# Patient Record
Sex: FEMALE | Race: WHITE | NOT HISPANIC OR LATINO | URBAN - METROPOLITAN AREA
[De-identification: names, ages, dates, MRNs, and addresses within clinical notes are randomized per-mention and may not be internally consistent; named-entity substitution may affect disease eponyms.]

---

## 2021-03-25 NOTE — PATIENT DISCUSSION
"""Type 2 diabetic eye exam. Proliferative diabetic retinopathy found with the presence of macular ""

## 2021-03-25 NOTE — PATIENT DISCUSSION
"""Type 2 diabetic eye exam with dilation. Moderate diabetic retinopathy found. Macular edema is not present in the right eye. Patient instructed to call office immediately if sudden changes in vision occur. Emphasized importance of good blood glucose control. Return in 6 months for dilated fundus exam. Summary of care provided to the physician managing the ongoing diabetes care. """

## 2021-07-21 NOTE — PATIENT DISCUSSION
CATARACT SURGERY PLANNER - STANDARD IOL/+FEMTO: Phacoemulsification with IOL: Eye: LEFT|DOS: 8/3/21|Model: DIBOO|Power: +19.50|Target: PLANO|Femto: YES|Arcs: 40 @ 170 ; 40 @ 350|Visc: DUET|Omidria: YES|10% Phenylephrine: YES|Epi-shugarcaine: YES|Phaco Setting: DENSE|BSS+: NO|Trypan Blue: NO|CTR: NO|Olive Tip: NO|Atropine: NO|Pupilloplasty: NO|Notes: TM.NEEDS TRANSPORTATION. IDDM.

## 2021-08-03 NOTE — PROCEDURE NOTE: SURGICAL
See scanned Op Notes. &nbsp; &nbsp; &nbsp; &nbsp; NO TM Used. Dr. Bertrum Soulier to dictate operative report.  KP

## 2021-08-03 NOTE — PATIENT DISCUSSION
Nucleus and cortex removed completely. Zonular Dehiscence 3-9 O'clock creating insufficient capsular support.  Patient referred to Dr. Rosita Hussein for Secondary sutured IOL placement OS. Appointment made for 8/3/2021 @3:00 PM in [address] [place] [address] with Dr. Rosita Hussein.

## 2021-08-03 NOTE — PATIENT DISCUSSION
Discussed with patient limited vision potential due to [facility] pathology.

## 2021-08-04 NOTE — PATIENT DISCUSSION
1 day PO: Patient is doing well post-operatively. The importance of post-op drop compliance was emphasized. Drop schedule reviewed with patient. Restrictions d/w pt including no bending/heavy lifting, wear shield at night. Patient to call if any visual changes or concerns. Follow up as directed. Pt. say Dr. Felisha Dejesus yesterday and will be scheduled for secondary IOL placement OS and removal of cortical material OS.

## 2021-08-04 NOTE — PATIENT DISCUSSION
Nucleus and cortex removed completely. Zonular Dehiscence 3-9 O'clock creating insufficient capsular support.  Patient referred to Dr. Regis Anne for Secondary sutured IOL placement OS. Appointment made for 8/3/2021 @3:00 PM in [address] [place] [address] with Dr. Regis Anne.

## 2021-08-04 NOTE — PATIENT DISCUSSION
Canceling all appointments and sx for OD until OS has resolved. Per-VG. Patient agrees and understands.

## 2023-01-19 ENCOUNTER — CONSULTATION/EVALUATION (OUTPATIENT)
Dept: URBAN - METROPOLITAN AREA CLINIC 17 | Facility: CLINIC | Age: 68
End: 2023-01-19

## 2023-01-19 DIAGNOSIS — H02.831: ICD-10-CM

## 2023-01-19 DIAGNOSIS — H02.834: ICD-10-CM

## 2023-01-19 PROCEDURE — 92285 EXTERNAL OCULAR PHOTOGRAPHY: CPT

## 2023-01-19 PROCEDURE — 92002 INTRM OPH EXAM NEW PATIENT: CPT

## 2023-01-19 PROCEDURE — 92082 INTERMEDIATE VISUAL FIELD XM: CPT

## 2023-01-19 ASSESSMENT — VISUAL ACUITY
OS_SC: 20/200
OU_CC: 20/25
OS_CC: 20/25
OD_CC: 20/25
OU_SC: 20/200
OD_SC: 20/200

## 2023-04-04 ENCOUNTER — POST-OP (OUTPATIENT)
Dept: URBAN - METROPOLITAN AREA CLINIC 4 | Facility: CLINIC | Age: 68
End: 2023-04-04

## 2023-04-04 DIAGNOSIS — Z98.890: ICD-10-CM

## 2023-04-04 PROCEDURE — 99024 POSTOP FOLLOW-UP VISIT: CPT

## 2023-04-04 ASSESSMENT — VISUAL ACUITY
OS_CC: 20/25
OD_CC: 20/25

## 2023-06-30 ENCOUNTER — CONTACT LENSES/GLASSES VISIT (OUTPATIENT)
Dept: URBAN - METROPOLITAN AREA CLINIC 4 | Facility: CLINIC | Age: 68
End: 2023-06-30

## 2023-06-30 DIAGNOSIS — H52.7: ICD-10-CM

## 2023-06-30 PROCEDURE — 99211NC NO CHARGE VISIT

## 2023-06-30 ASSESSMENT — VISUAL ACUITY
OD_SC: 20/20-2
OS_SC: 20/30-2

## 2025-02-04 ENCOUNTER — COMPREHENSIVE EXAM (OUTPATIENT)
Age: 70
End: 2025-02-04

## 2025-02-04 DIAGNOSIS — H04.123: ICD-10-CM

## 2025-02-04 DIAGNOSIS — H52.7: ICD-10-CM

## 2025-02-04 PROCEDURE — 92015 DETERMINE REFRACTIVE STATE: CPT

## 2025-02-04 PROCEDURE — 92014 COMPRE OPH EXAM EST PT 1/>: CPT

## 2025-02-04 RX ORDER — ERYTHROMYCIN 5 MG/G
OINTMENT OPHTHALMIC EVERY EVENING
Start: 2025-02-04